# Patient Record
Sex: FEMALE | Race: OTHER | HISPANIC OR LATINO | URBAN - METROPOLITAN AREA
[De-identification: names, ages, dates, MRNs, and addresses within clinical notes are randomized per-mention and may not be internally consistent; named-entity substitution may affect disease eponyms.]

---

## 2024-04-14 ENCOUNTER — EMERGENCY (EMERGENCY)
Facility: HOSPITAL | Age: 54
LOS: 1 days | Discharge: HOME | End: 2024-04-14
Attending: EMERGENCY MEDICINE
Payer: COMMERCIAL

## 2024-04-14 VITALS
HEIGHT: 62 IN | DIASTOLIC BLOOD PRESSURE: 84 MMHG | OXYGEN SATURATION: 99 % | SYSTOLIC BLOOD PRESSURE: 138 MMHG | TEMPERATURE: 98 F | RESPIRATION RATE: 18 BRPM | WEIGHT: 169.76 LBS | HEART RATE: 68 BPM

## 2024-04-14 PROCEDURE — 93005 ELECTROCARDIOGRAM TRACING: CPT

## 2024-04-14 PROCEDURE — 99284 EMERGENCY DEPT VISIT MOD MDM: CPT | Mod: 25

## 2024-04-14 PROCEDURE — 82962 GLUCOSE BLOOD TEST: CPT

## 2024-04-14 PROCEDURE — 99285 EMERGENCY DEPT VISIT HI MDM: CPT

## 2024-04-14 PROCEDURE — 70450 CT HEAD/BRAIN W/O DYE: CPT | Mod: 26,MC

## 2024-04-14 PROCEDURE — 70450 CT HEAD/BRAIN W/O DYE: CPT | Mod: MC

## 2024-04-14 RX ORDER — MECLIZINE HCL 12.5 MG
1 TABLET ORAL
Qty: 10 | Refills: 0
Start: 2024-04-14 | End: 2024-04-18

## 2024-04-14 RX ORDER — MECLIZINE HCL 12.5 MG
25 TABLET ORAL ONCE
Refills: 0 | Status: COMPLETED | OUTPATIENT
Start: 2024-04-14 | End: 2024-04-14

## 2024-04-14 RX ADMIN — Medication 25 MILLIGRAM(S): at 16:03

## 2024-04-14 NOTE — ED PROVIDER NOTE - PROGRESS NOTE DETAILS
An: neuro intact. cth on my read no acute findings. pending official read then will dc. pt feels better overall.

## 2024-04-14 NOTE — ED PROVIDER NOTE - NEUROLOGICAL, MLM
Alert and oriented, no focal deficits, no motor or sensory deficits. CN ii-xii intact, no dysmetria, non-ataxic gait, neck supple.

## 2024-04-14 NOTE — ED PROVIDER NOTE - CLINICAL SUMMARY MEDICAL DECISION MAKING FREE TEXT BOX
54 yr old female with no hx presents to ed c/o vertigo since 1 day. mild nausea. worse with change of position but feels it is constant. no trauma, no recent uri, no drugs, no bleeding, no visual changes, no hearing loss, no headache, no dysuria, no focal weakness or cp/palpitations.     exam consistent with peripheral vertigo r/o arrythmia. no clinical sign suggestive of anemia with normal vitals and no sx of loss. pt w/o any infectious sx or dehydration. neurovascularly intact- cth, meclizine, fs

## 2024-04-14 NOTE — ED PROVIDER NOTE - CHILD ABUSE FACILITY
Body Location Override (Optional - Billing Will Still Be Based On Selected Body Map Location If Applicable): left distal pretibial region Add 59235 Cpt? (Important Note: In 2017 The Use Of 28744 Is Being Tracked By Cms To Determine Future Global Period Reimbursement For Global Periods): yes Detail Level: Detailed H

## 2024-04-14 NOTE — ED PROVIDER NOTE - OBJECTIVE STATEMENT
Pt here c/o bilateral lower abd pain/bloating, upper abd pain. Lower back pain. Onset of symptoms started on Sat night. Diarrhea x5 today. Feeling lightheaded and having some weakness. Denies fever. Denies urinary symptoms.
54 yr old female with no hx presents to ed c/o vertigo since 1 day. mild nausea. worse with change of position but feels it is constant. no trauma, no recent uri, no drugs, no bleeding, no visual changes, no hearing loss, no headache, no dysuria, no focal weakness or cp/palpitations.

## 2024-04-14 NOTE — ED PROVIDER NOTE - NSFOLLOWUPINSTRUCTIONS_ED_ALL_ED_FT
Please see neurologist if continues. you have vertigo.     Consulte al neurólogo si continúa. tienes vértigo.

## 2024-04-14 NOTE — ED PROVIDER NOTE - PATIENT PORTAL LINK FT
You can access the FollowMyHealth Patient Portal offered by [facility] by registering at the following website: [email or web address]. By joining [facility]’s FollowMyHealth portal, you will also be able to view your health information using other applications (apps) compatible with our system.